# Patient Record
Sex: FEMALE
[De-identification: names, ages, dates, MRNs, and addresses within clinical notes are randomized per-mention and may not be internally consistent; named-entity substitution may affect disease eponyms.]

---

## 2018-09-23 ENCOUNTER — HOSPITAL ENCOUNTER (EMERGENCY)
Dept: HOSPITAL 14 - H.ER | Age: 63
Discharge: HOME | End: 2018-09-23
Payer: SELF-PAY

## 2018-09-23 VITALS — HEART RATE: 78 BPM | SYSTOLIC BLOOD PRESSURE: 130 MMHG | TEMPERATURE: 98.6 F | DIASTOLIC BLOOD PRESSURE: 78 MMHG

## 2018-09-23 VITALS — BODY MASS INDEX: 30.1 KG/M2

## 2018-09-23 VITALS — OXYGEN SATURATION: 100 % | RESPIRATION RATE: 18 BRPM

## 2018-09-23 DIAGNOSIS — S32.000A: Primary | ICD-10-CM
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
## 2018-09-23 PROCEDURE — 99283 EMERGENCY DEPT VISIT LOW MDM: CPT

## 2018-09-23 PROCEDURE — 96372 THER/PROPH/DIAG INJ SC/IM: CPT

## 2018-09-23 PROCEDURE — 72125 CT NECK SPINE W/O DYE: CPT

## 2018-09-23 PROCEDURE — 72114 X-RAY EXAM L-S SPINE BENDING: CPT

## 2018-09-23 PROCEDURE — 72131 CT LUMBAR SPINE W/O DYE: CPT

## 2018-09-23 PROCEDURE — 70450 CT HEAD/BRAIN W/O DYE: CPT

## 2018-09-23 NOTE — CT
Date of service: 



09/23/2018



PROCEDURE:  CT Cervical Spine without contrast



HISTORY:

neck pain



COMPARISON:

None available.



TECHNIQUE:

Axial computed tomography images were obtained of the cervical spine 

without the use of intravenous contrast. Coronal and sagittal 

reformatted images were created and reviewed.



Radiation dose: 



Total exam DLP =  mGy-cm.



This CT exam was performed using one or more of the following dose 

reduction techniques: Automated exposure control, adjustment of the 

mA and/or kV according to patient size, and/or use of iterative 

reconstruction technique.



FINDINGS:



VERTEBRAE:

No fracture. Normal alignment. No destructive bony lesion.



DISCS/SPINAL CANAL/NEURAL FORAMINA:

Multilevel degenerative disc disease and spondylosis.  No fracture..



PARASPINAL SOFT TISSUES:

Unremarkable. 



OTHER FINDINGS:

None.



IMPRESSION:

No fracture..

## 2018-09-23 NOTE — RAD
Date of service: 



09/23/2018



PROCEDURE:  Radiographs of the Lumbar Spine.



HISTORY:

back pain







COMPARISON:

No prior.



FINDINGS:



BONES:

Mild to moderate anterior wedge compression fracture of L2.



DISC SPACES:

Unremarkable.



OTHER FINDINGS:

None.



IMPRESSION:

Mild to moderate anterior wedge compression fracture of L2.

## 2018-09-23 NOTE — ED PDOC
- ECG


O2 Sat by Pulse Oximetry: 100 (RA)


Pulse Ox Interpretation: Normal





Medical Decision Making


Medical Decision Making: 


Time: 1500


-- Patient endorsed to me by Dr. Gilmore, pending CT of the Lumbar spine, 

reassessment and final ER disposition. 





Time: 1726


CT RESULTS


FINDINGS:


Vertebrae: There is an acute fracture of the superior endplate of L2. There is 

loss of normal


vertebral body height by 10-20%. No other fractures. Normal alignment.


Discs/spinal canal/neural foramina: No acute findings. No spinal canal stenosis.


Soft tissues: Unremarkable.


IMPRESSION:


Superior endplate fracture of L2.


Thank you for allowing us to participate in the care of your patient.


Dictated and Authenticated by: Rufino Waters MD


09/23/2018 5:26 PM Eastern Time ([place] & [place])





Time: 1746


-- Discussed with patient findings. At this time, patient presents with no 

deficits and is comfortable walking. Patient is instructed to follow up as an 

outpatient. 


________________________________________________________________________________

____


Scribe Attestation:


Documented by Marco Feliciano acting as a scribe for Camryn Serna MD.





Provider Scribe Attestation:


All medical record entries made by the Scribe were at my direction and 

personally dictated by me. I have reviewed the chart and agree that the record 

accurately reflects my personal performance of the history, physical exam, 

medical decision making, and the department course for this patient. I have 

also personally directed, reviewed, and agree with the discharge instructions 

and disposition.





Disposition





- Clinical Impression


Clinical Impression: 


 Fracture, lumbar vertebra, compression








- POA


Present On Arrival: Falls Or Trauma





- Disposition


Referrals: 


[facility] [Outside]


[facility] [Outside] ([facility]A SEMANA POR 

MAS EVALUACIONES Y TRATIMIENTE.)


Disposition: Routine/Home


Disposition Time: 15:46


Condition: STABLE


Prescriptions: 


Ibuprofen [Motrin Tab] 600 mg PO Q8 PRN #30 tab


 PRN Reason: Pain, Moderate (4-7)


Lidocaine 5% [Lidoderm] 1 ea TD DAILY PRN #20 patch


 PRN Reason: PAIN


Instructions:  Vertebral Compression Fracture (DC)


Forms:  [employer] (English)


Print Language: Hungarian

## 2018-09-23 NOTE — CT
Date of service: 



09/23/2018



PROCEDURE:  CT HEAD WITHOUT CONTRAST.



HISTORY:

headache



COMPARISON:

None available.



TECHNIQUE:

Axial computed tomography images were obtained through the head/brain 

without intravenous contrast.  



Radiation dose:



Total exam DLP =  mGy-cm.



This CT exam was performed using one or more of the following dose 

reduction techniques: Automated exposure control, adjustment of the 

mA and/or kV according to patient size, and/or use of iterative 

reconstruction technique.



FINDINGS:



HEMORRHAGE:

No intracranial hemorrhage. 



BRAIN:

No mass effect or edema.  No atrophy or chronic microvascular 

ischemic changes.



VENTRICLES:

Unremarkable. No hydrocephalus. 



CALVARIUM:

Unremarkable.



PARANASAL SINUSES:

Unremarkable as visualized. No significant inflammatory changes.



MASTOID AIR CELLS:

Unremarkable as visualized. No inflammatory changes.



OTHER FINDINGS:

None.



IMPRESSION:

Normal CT of the Head.

## 2018-09-23 NOTE — CT
Date of service: 



09/23/2018



PROCEDURE:  CT Lumbar Spine without contrast



HISTORY:

pain, possible fx



COMPARISON:

None available.



TECHNIQUE:

Axial computed tomography images were obtained of the lumbar spine 

without the use of intravenous contrast. Coronal and sagittal 

reformatted images were created and reviewed. 



Radiation dose:



Total exam DLP =  mGy-cm.



This CT exam was performed using one or more of the following dose 

reduction techniques: Automated exposure control, adjustment of the 

mA and/or kV according to patient size, and/or use of iterative 

reconstruction technique.



FINDINGS:



VERTEBRAE:

Mild to moderate anterior wedge compression fracture of L2.  

prevertebral soft tissue edema suggesting acute age. 



DISCS/SPINAL CANAL/NEURAL FORAMINA:

L1-2:     Unremarkable.



L2-3:     Unremarkable.



L3-4:     Unremarkable.



L4-5:     Unremarkable.



L5-S1:   Unremarkable.



PARASPINAL SOFT TISSUES:

Unremarkable. 



OTHER FINDINGS:

None. 



IMPRESSION:

Mild to moderate anterior wedge compression fracture of L2; 

prevertebral soft tissue edema suggesting acute age.
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
